# Patient Record
Sex: FEMALE | Race: WHITE | NOT HISPANIC OR LATINO | Employment: UNEMPLOYED | URBAN - METROPOLITAN AREA
[De-identification: names, ages, dates, MRNs, and addresses within clinical notes are randomized per-mention and may not be internally consistent; named-entity substitution may affect disease eponyms.]

---

## 2017-12-06 ENCOUNTER — RECORDS - HEALTHEAST (OUTPATIENT)
Dept: LAB | Facility: CLINIC | Age: 32
End: 2017-12-06

## 2017-12-06 LAB
CHOLEST SERPL-MCNC: 202 MG/DL
FASTING STATUS PATIENT QL REPORTED: NO
HDLC SERPL-MCNC: 66 MG/DL
LDLC SERPL CALC-MCNC: 112 MG/DL
TRIGL SERPL-MCNC: 118 MG/DL

## 2018-08-09 ENCOUNTER — OFFICE VISIT (OUTPATIENT)
Dept: FAMILY MEDICINE | Facility: CLINIC | Age: 33
End: 2018-08-09
Payer: COMMERCIAL

## 2018-08-09 VITALS
RESPIRATION RATE: 18 BRPM | TEMPERATURE: 98.2 F | BODY MASS INDEX: 22.5 KG/M2 | DIASTOLIC BLOOD PRESSURE: 78 MMHG | SYSTOLIC BLOOD PRESSURE: 125 MMHG | OXYGEN SATURATION: 100 % | WEIGHT: 140 LBS | HEART RATE: 72 BPM | HEIGHT: 66 IN

## 2018-08-09 DIAGNOSIS — E03.4 HYPOTHYROIDISM DUE TO ACQUIRED ATROPHY OF THYROID: ICD-10-CM

## 2018-08-09 DIAGNOSIS — Z01.419 ENCOUNTER FOR GYNECOLOGICAL EXAMINATION WITHOUT ABNORMAL FINDING: Primary | ICD-10-CM

## 2018-08-09 DIAGNOSIS — R60.0 LOCALIZED EDEMA: ICD-10-CM

## 2018-08-09 LAB — HGB BLD-MCNC: 12.6 G/DL (ref 11.7–15.7)

## 2018-08-09 PROCEDURE — G0476 HPV COMBO ASSAY CA SCREEN: HCPCS | Performed by: NURSE PRACTITIONER

## 2018-08-09 PROCEDURE — 36415 COLL VENOUS BLD VENIPUNCTURE: CPT | Performed by: NURSE PRACTITIONER

## 2018-08-09 PROCEDURE — 80048 BASIC METABOLIC PNL TOTAL CA: CPT | Performed by: NURSE PRACTITIONER

## 2018-08-09 PROCEDURE — G0145 SCR C/V CYTO,THINLAYER,RESCR: HCPCS | Performed by: NURSE PRACTITIONER

## 2018-08-09 PROCEDURE — 85018 HEMOGLOBIN: CPT | Performed by: NURSE PRACTITIONER

## 2018-08-09 PROCEDURE — 84443 ASSAY THYROID STIM HORMONE: CPT | Performed by: NURSE PRACTITIONER

## 2018-08-09 PROCEDURE — 99395 PREV VISIT EST AGE 18-39: CPT | Performed by: NURSE PRACTITIONER

## 2018-08-09 RX ORDER — LEVOTHYROXINE SODIUM 75 UG/1
75 TABLET ORAL DAILY
Qty: 90 TABLET | Refills: 3 | Status: SHIPPED | OUTPATIENT
Start: 2018-08-09

## 2018-08-09 RX ORDER — ETONOGESTREL/ETHINYL ESTRADIOL .12-.015MG
RING, VAGINAL VAGINAL
Refills: 4 | COMMUNITY
Start: 2018-06-12

## 2018-08-09 RX ORDER — LEVOTHYROXINE SODIUM 75 UG/1
TABLET ORAL
Refills: 6 | COMMUNITY
Start: 2018-06-22 | End: 2018-08-09

## 2018-08-09 NOTE — PATIENT INSTRUCTIONS

## 2018-08-09 NOTE — PROGRESS NOTES
SUBJECTIVE:   CC: Esther Gardner is an 32 year old woman who presents for preventive health visit.     Physical   Annual:     Getting at least 3 servings of Calcium per day:  Yes    Bi-annual eye exam:  NO    Dental care twice a year:  Yes    Sleep apnea or symptoms of sleep apnea:  None    Diet:  Carbohydrate counting    Frequency of exercise:  4-5 days/week    Duration of exercise:  45-60 minutes    Taking medications regularly:  Yes    Medication side effects:  None    ADDITIONAL:  Swelling, Ankles and calves, mostly left, water retention.  Drinking water.        Hypothyroid:  Diagnosed several years ago.  Levothyroxine 75mg daily seems to be going well.        Today's PHQ-2 Score:   PHQ-2 ( 1999 [employer]) 8/9/2018   Q1: Little interest or pleasure in doing things 0   Q2: Feeling down, depressed or hopeless 0   PHQ-2 Score 0   Q1: Little interest or pleasure in doing things Not at all   Q2: Feeling down, depressed or hopeless Not at all   PHQ-2 Score 0       Abuse: Current or Past(Physical, Sexual or Emotional)- No  Do you feel safe in your environment - Yes    Social History   Substance Use Topics     Smoking status: Former Smoker     Smokeless tobacco: Never Used     Alcohol use Yes     Alcohol Use 8/9/2018   If you drink alcohol do you typically have greater than 3 drinks per day OR greater than 7 drinks per week? No     Reviewed orders with patient.  Reviewed health maintenance and updated orders accordingly - Yes  Labs reviewed in EPIC  BP Readings from Last 3 Encounters:   08/09/18 125/78    Wt Readings from Last 3 Encounters:   08/09/18 140 lb (63.5 kg)                  There is no problem list on file for this patient.    History reviewed. No pertinent surgical history.    Social History   Substance Use Topics     Smoking status: Former Smoker     Smokeless tobacco: Never Used     Alcohol use Yes     Family History   Problem Relation Age of Onset     Hypertension Mother          Current Outpatient  "Prescriptions   Medication Sig Dispense Refill     levothyroxine (SYNTHROID/LEVOTHROID) 75 MCG tablet TAKE 1 TABLET BY MOUTH DAILY.  6     NUVARING 0.12-0.015 MG/24HR vaginal ring INSERT 1 RING VAGINALLY AS DIRECTED. REMOVE AFTER 3 WEEKS & WAIT 7 DAYS BEFORE INSERTING A NEW RING  4     Allergies   Allergen Reactions     Penicillins      No lab results found.     Mammogram not appropriate for this patient based on age.    Pertinent mammograms are reviewed under the imaging tab.  History of abnormal Pap smear: Last 3 Pap and HPV Results:       Reviewed and updated as needed this visit by clinical staff  Tobacco  Allergies  Meds  Med Hx  Surg Hx  Fam Hx  Soc Hx        Reviewed and updated as needed this visit by Provider            Review of Systems  CONSTITUTIONAL: NEGATIVE for fever, chills, change in weight  INTEGUMENTARU/SKIN: NEGATIVE for worrisome rashes, moles or lesions  EYES: NEGATIVE for vision changes or irritation  ENT: NEGATIVE for ear, mouth and throat problems  RESP: NEGATIVE for significant cough or SOB  BREAST: NEGATIVE for masses, tenderness or discharge  CV: NEGATIVE for chest pain, palpitations or peripheral edema  GI: NEGATIVE for nausea, abdominal pain, heartburn, or change in bowel habits  : NEGATIVE for unusual urinary or vaginal symptoms. Periods are regular.  MUSCULOSKELETAL: NEGATIVE for significant arthralgias or myalgia  NEURO: NEGATIVE for weakness, dizziness or paresthesias  ENDOCRINE: NEGATIVE for temperature intolerance, skin/hair changes  PSYCHIATRIC: NEGATIVE for changes in mood or affect     OBJECTIVE:   /78  Pulse 72  Temp 98.2  F (36.8  C) (Oral)  Resp 18  Ht 5' 5.5\" (1.664 m)  Wt 140 lb (63.5 kg)  SpO2 100%  BMI 22.94 kg/m2  Physical Exam  GENERAL: healthy, alert and no distress  EYES: Eyes grossly normal to inspection, PERRL and conjunctivae and sclerae normal  HENT: ear canals and TM's normal, nose and mouth without ulcers or lesions  NECK: no adenopathy, no " asymmetry, masses, or scars and thyroid normal to palpation  RESP: lungs clear to auscultation - no rales, rhonchi or wheezes  BREAST: normal without masses, tenderness or nipple discharge and no palpable axillary masses or adenopathy  CV: regular rate and rhythm, normal S1 S2, no S3 or S4, no murmur, click or rub, and peripheral pulses strong  ABDOMEN: soft, nontender, no hepatosplenomegaly, no masses and bowel sounds normal   (female): normal female external genitalia, normal urethral meatus, vaginal mucosa pink, moist, well rugated, and normal cervix/adnexa/uterus without masses or discharge  MS: no gross musculoskeletal defects noted  SKIN: no suspicious lesions or rashes. 1+ mid tibial edema bilaterally  NEURO: Normal strength and tone, mentation intact and speech normal  PSYCH: mentation appears normal, affect normal/bright    Diagnostic Test Results:  Resuls pending    ASSESSMENT/PLAN:   (Z01.419) Encounter for gynecological examination without abnormal finding  (primary encounter diagnosis)  Comment:   Plan: levothyroxine (SYNTHROID/LEVOTHROID) 75 MCG         tablet, Pap imaged thin layer screen with HPV -        recommended age 30 - 65 years (select HPV order        below), HPV High Risk Types DNA Cervical, TSH         with free T4 reflex            (E03.4) Hypothyroidism due to acquired atrophy of thyroid  Comment:   Plan: levothyroxine (SYNTHROID/LEVOTHROID) 75 MCG         tablet, TSH with free T4 reflex        Labs pending today.  Anticipate no changes in her levothyroxine dosing.  Yearly checkups/check ends for levothyroxine, sooner if pregnant.    (R60.0) Localized edema  Comment:   Plan: Basic metabolic panel, Hemoglobin  I discussed with the patient that I am not up for treating her mild edema today.  For now, I encouraged her to eat a diet low in salt, drink her fluids, and elevate her lower extremities when sitting.  In the event that her lab studies come back abnormal and/or if the edema worsens  "in any way, I would like her to return to the clinic for additional evaluation.  She agrees and understands.      COUNSELING:  Reviewed preventive health counseling, as reflected in patient instructions    BP Readings from Last 1 Encounters:   08/09/18 125/78     Estimated body mass index is 22.94 kg/(m^2) as calculated from the following:    Height as of this encounter: 5' 5.5\" (1.664 m).    Weight as of this encounter: 140 lb (63.5 kg).           reports that she has quit smoking. She has never used smokeless tobacco.      Counseling Resources:  ATP IV Guidelines  Pooled Cohorts Equation Calculator  Breast Cancer Risk Calculator  FRAX Risk Assessment  ICSI Preventive Guidelines  Dietary Guidelines for Americans, 2010  USDA's MyPlate  ASA Prophylaxis  Lung CA Screening    KIMMY Paz [facility]  Answers for HPI/ROS submitted by the patient on 8/9/2018   PHQ-2 Score: 0    "

## 2018-08-09 NOTE — MR AVS SNAPSHOT
After Visit Summary   8/9/2018    Esther Gardner    MRN: [id number]           Patient Information     Date Of Birth          1985        Visit Information        Provider Department      8/9/2018 4:00 PM Cherrie Meyers APRN [facility]        Today's Diagnoses     Encounter for gynecological examination without abnormal finding    -  1    Hypothyroidism due to acquired atrophy of thyroid        Localized edema          Care Instructions      Preventive Health Recommendations  Female Ages 26 - 39  Yearly exam:   See your health care provider every year in order to    Review health changes.     Discuss preventive care.      Review your medicines if you your doctor has prescribed any.    Until age 30: Get a Pap test every three years (more often if you have had an abnormal result).    After age 30: Talk to your doctor about whether you should have a Pap test every 3 years or have a Pap test with HPV screening every 5 years.   You do not need a Pap test if your uterus was removed (hysterectomy) and you have not had cancer.  You should be tested each year for STDs (sexually transmitted diseases), if you're at risk.   Talk to your provider about how often to have your cholesterol checked.  If you are at risk for diabetes, you should have a diabetes test (fasting glucose).  Shots: Get a flu shot each year. Get a tetanus shot every 10 years.   Nutrition:     Eat at least 5 servings of fruits and vegetables each day.    Eat whole-grain bread, whole-wheat pasta and brown rice instead of white grains and rice.    Get adequate Calcium and Vitamin D.     Lifestyle    Exercise at least 150 minutes a week (30 minutes a day, 5 days of the week). This will help you control your weight and prevent disease.    Limit alcohol to one drink per day.    No smoking.     Wear sunscreen to prevent skin cancer.    See your dentist every six months for an exam and cleaning.                 "Follow-ups after your visit        Who to contact     If you have questions or need follow up information about today's clinic visit or your schedule please contact [facility] directly at [phone].  Normal or non-critical lab and imaging results will be communicated to you by MyChart, letter or phone within 4 business days after the clinic has received the results. If you do not hear from us within 7 days, please contact the clinic through MyChart or phone. If you have a critical or abnormal lab result, we will notify you by phone as soon as possible.  Submit refill requests through [employer] or call your pharmacy and they will forward the refill request to us. Please allow 3 business days for your refill to be completed.          Additional Information About Your Visit        [employer] Information     [employer] lets you send messages to your doctor, view your test results, renew your prescriptions, schedule appointments and more. To sign up, go to [email or web address] . Click on \"Log in\" on the left side of the screen, which will take you to the Welcome page. Then click on \"Sign up Now\" on the right side of the page.     You will be asked to enter the access code listed below, as well as some personal information. Please follow the directions to create your username and password.     Your access code is: [email or web address]  Expires: 2018  4:24 PM     Your access code will  in 90 days. If you need help or a new code, please call your [facility] clinic or [phone].        Care EveryWhere ID     This is your Care EveryWhere ID. This could be used by other organizations to access your [facility] medical records  [id number]        Your Vitals Were     Pulse Temperature Respirations Height Last Period Pulse Oximetry    72 98.2  F (36.8  C) (Oral) 18 5' 5.5\" (1.664 m) 2018 (Approximate) 100%    BMI (Body Mass Index)                   22.94 kg/m2            Blood Pressure from Last 3 " Encounters:   08/09/18 125/78    Weight from Last 3 Encounters:   08/09/18 140 lb (63.5 kg)              We Performed the Following     Basic metabolic panel     Hemoglobin     HPV High Risk Types DNA Cervical     Pap imaged thin layer screen with HPV - recommended age 30 - 65 years (select HPV order below)     TSH with free T4 reflex          Today's Medication Changes          These changes are accurate as of 8/9/18  4:24 PM.  If you have any questions, ask your nurse or doctor.               These medicines have changed or have updated prescriptions.        Dose/Directions    levothyroxine 75 MCG tablet   Commonly known as:  SYNTHROID/LEVOTHROID   This may have changed:  See the new instructions.   Used for:  Encounter for gynecological examination without abnormal finding, Hypothyroidism due to acquired atrophy of thyroid   Changed by:  Cherrie Meyers APRN CNP        Dose:  75 mcg   Take 1 tablet (75 mcg) by mouth daily   Quantity:  90 tablet   Refills:  3            Where to get your medicines      These medications were sent to [facility] [facility] - [place], [place] - [address]  [address], [place] [address]     Phone:  [phone]     levothyroxine 75 MCG tablet                Primary Care Provider Fax #    Physician No Ref-Primary [phone]       No address on file        Equal Access to Services     RAQUEL RAMON : Yayo hawthorne Sogrecia, wadebda luashley, qaybta kaalmada adehair, jennifer isaac. So [facility] [phone].    ATENCIÓN: Si habla español, tiene a cevallos disposición servicios gratuitos de asistencia lingüística. Llame al [phone].    We comply with applicable federal civil rights laws and [place] laws. We do not discriminate on the basis of race, color, national origin, age, disability, sex, sexual orientation, or gender identity.            Thank you!     Thank you for choosing [facility]  for your care. Our goal is  always to provide you with excellent care. Hearing back from our patients is one way we can continue to improve our services. Please take a few minutes to complete the written survey that you may receive in the mail after your visit with us. Thank you!             Your Updated Medication List - Protect others around you: Learn how to safely use, store and throw away your medicines at www.disposemymeds.org.          This list is accurate as of 8/9/18  4:24 PM.  Always use your most recent med list.                   Brand Name Dispense Instructions for use Diagnosis    levothyroxine 75 MCG tablet    SYNTHROID/LEVOTHROID    90 tablet    Take 1 tablet (75 mcg) by mouth daily    Encounter for gynecological examination without abnormal finding, Hypothyroidism due to acquired atrophy of thyroid       NUVARING 0.12-0.015 MG/24HR vaginal ring   Generic drug:  etonogestrel-ethinyl estradiol      INSERT 1 RING VAGINALLY AS DIRECTED. REMOVE AFTER 3 WEEKS & WAIT 7 DAYS BEFORE INSERTING A NEW RING

## 2018-08-10 LAB
ANION GAP SERPL CALCULATED.3IONS-SCNC: 9 MMOL/L (ref 3–14)
BUN SERPL-MCNC: 14 MG/DL (ref 7–30)
CALCIUM SERPL-MCNC: 9 MG/DL (ref 8.5–10.1)
CHLORIDE SERPL-SCNC: 108 MMOL/L (ref 94–109)
CO2 SERPL-SCNC: 24 MMOL/L (ref 20–32)
CREAT SERPL-MCNC: 0.78 MG/DL (ref 0.52–1.04)
GFR SERPL CREATININE-BSD FRML MDRD: 85 ML/MIN/1.7M2
GLUCOSE SERPL-MCNC: 81 MG/DL (ref 70–99)
POTASSIUM SERPL-SCNC: 4.1 MMOL/L (ref 3.4–5.3)
SODIUM SERPL-SCNC: 141 MMOL/L (ref 133–144)
TSH SERPL DL<=0.005 MIU/L-ACNC: 2.37 MU/L (ref 0.4–4)

## 2018-08-10 NOTE — PROGRESS NOTES
Medardo Santana,    This is to let you know that the results of your recent blood tests are all normal.     Let me know if you have any questions.    Cherrie ONEAL CNP

## 2018-08-14 ENCOUNTER — MYC MEDICAL ADVICE (OUTPATIENT)
Dept: FAMILY MEDICINE | Facility: CLINIC | Age: 33
End: 2018-08-14

## 2018-08-14 LAB
COPATH REPORT: NORMAL
PAP: NORMAL

## 2018-08-14 NOTE — TELEPHONE ENCOUNTER
Tracey does not do this. I will print the release and forward to [facility] or outside facility.  Brittni ALVARENGA  [place]

## 2018-08-14 NOTE — TELEPHONE ENCOUNTER
ISRRAEL Meyers a release has been signed so results can be faxed to [facility] at [phone].    Send to care team with what you would like released to them.  Danitza Lunsford RN

## 2018-08-16 LAB
FINAL DIAGNOSIS: NORMAL
HPV HR 12 DNA CVX QL NAA+PROBE: NEGATIVE
HPV16 DNA SPEC QL NAA+PROBE: NEGATIVE
HPV18 DNA SPEC QL NAA+PROBE: NEGATIVE
SPECIMEN DESCRIPTION: NORMAL
SPECIMEN SOURCE CVX/VAG CYTO: NORMAL

## 2019-01-14 ENCOUNTER — MYC MEDICAL ADVICE (OUTPATIENT)
Dept: FAMILY MEDICINE | Facility: CLINIC | Age: 34
End: 2019-01-14

## 2019-01-14 NOTE — TELEPHONE ENCOUNTER
Cherrie Meyers, APRN CNP    Please review my chart message and advise     Thank you,   Stefany Irving, Registered Nurse   [facility]

## 2019-01-15 NOTE — TELEPHONE ENCOUNTER
Writer notes patient response    Closing encounter - no further actions needed at this time    Aria Gonzalez RN

## 2019-06-17 ENCOUNTER — NURSE TRIAGE (OUTPATIENT)
Dept: NURSING | Facility: CLINIC | Age: 34
End: 2019-06-17

## 2019-06-17 NOTE — TELEPHONE ENCOUNTER
"\"I had a positive pregnancy test yesterday and I started to bleed today\".    Warm transfer to scheduling to make an appointment.    Reason for Disposition    [1] Intermittent lower abdominal pain (e.g., cramping) AND [2] present > 24 hours    Additional Information    Negative: Shock suspected (e.g., cold/pale/clammy skin, too weak to stand, low BP, rapid pulse)    Negative: Difficult to awaken or acting confused (e.g., disoriented, slurred speech)    Negative: Passed out (i.e., lost consciousness, collapsed and was not responding)    Negative: Sounds like a life-threatening emergency to the triager    Negative: [1] Vaginal bleeding AND [2] pregnant > 20 weeks    Negative: Not pregnant or pregnancy status unknown    Negative: SEVERE abdominal pain    Negative: [1] SEVERE vaginal bleeding (i.e., soaking 2 pads / hour, large blood clots) AND [2] present 2 or more hours    Negative: SEVERE dizziness (e.g., unable to stand, requires support to walk, feels like passing out)    Negative: [1] MODERATE vaginal bleeding (i.e., soaking 1 pad / hour; clots) AND [2] present > 6 hours    Negative: [1] MODERATE vaginal bleeding (i.e., soaking 1 pad / hour; clots) AND [2] pregnant > 12 weeks    Negative: Passed tissue (e.g., gray-white)    Negative: Shoulder pain    Negative: Pale skin (pallor) of new onset or worsening    Negative: Patient sounds very sick or weak to the triager    Negative: [1] Constant abdominal pain AND [2] present > 2 hours    Negative: Fever > 100.4 F (38.0 C)    Protocols used: PREGNANCY - VAGINAL BLEEDING LESS THAN 20 WEEKS KYMBERLY Rowe RN  [facility]      "

## 2019-06-18 NOTE — TELEPHONE ENCOUNTER
Patient received a phone call from [facility] this morning (6/18/19) and was instructed to set up an appointment with OB instead of Family practice.  Per scheduling, an OB appointment was not available until next week.  Writer discussed patients symptoms and recommended that she follow-up in an Urgent Care or ED setting today as guideline recommended she be seen by a provider within 24 hours.  Patient with plans to go to a [facility] Urgent Care due to insurance restrictions.    Rhonda Luong RN  [facility]

## 2020-03-11 ENCOUNTER — HEALTH MAINTENANCE LETTER (OUTPATIENT)
Age: 35
End: 2020-03-11

## 2024-01-01 ENCOUNTER — APPOINTMENT (OUTPATIENT)
Dept: ULTRASOUND IMAGING | Facility: CLINIC | Age: 39
End: 2024-01-01
Attending: EMERGENCY MEDICINE
Payer: COMMERCIAL

## 2024-01-01 ENCOUNTER — HOSPITAL ENCOUNTER (EMERGENCY)
Facility: CLINIC | Age: 39
Discharge: HOME | End: 2024-01-01
Attending: EMERGENCY MEDICINE | Admitting: EMERGENCY MEDICINE
Payer: COMMERCIAL

## 2024-01-01 VITALS
BODY MASS INDEX: 23.32 KG/M2 | TEMPERATURE: 100.1 F | HEIGHT: 65 IN | DIASTOLIC BLOOD PRESSURE: 83 MMHG | RESPIRATION RATE: 16 BRPM | SYSTOLIC BLOOD PRESSURE: 150 MMHG | OXYGEN SATURATION: 100 % | HEART RATE: 78 BPM | WEIGHT: 140 LBS

## 2024-01-01 DIAGNOSIS — Z32.01 PREGNANCY TEST POSITIVE: ICD-10-CM

## 2024-01-01 LAB
HCG INTACT+B SERPL-ACNC: 126 MIU/ML
HCG UR QL: NEGATIVE

## 2024-01-01 PROCEDURE — 36415 COLL VENOUS BLD VENIPUNCTURE: CPT | Performed by: EMERGENCY MEDICINE

## 2024-01-01 PROCEDURE — 81025 URINE PREGNANCY TEST: CPT | Performed by: EMERGENCY MEDICINE

## 2024-01-01 PROCEDURE — 99284 EMERGENCY DEPT VISIT MOD MDM: CPT | Mod: 25

## 2024-01-01 PROCEDURE — 76801 OB US < 14 WKS SINGLE FETUS: CPT

## 2024-01-01 PROCEDURE — 84702 CHORIONIC GONADOTROPIN TEST: CPT | Performed by: EMERGENCY MEDICINE

## 2024-01-01 ASSESSMENT — ACTIVITIES OF DAILY LIVING (ADL)
ADLS_ACUITY_SCORE: 35
ADLS_ACUITY_SCORE: 35

## 2024-01-01 NOTE — DISCHARGE INSTRUCTIONS
Your hCG level today was 128 this is higher than it has been.  We have performed an ultrasound that shows a small cyst on the left ovary but no clear pregnancy.  Please follow-up with your OB/GYN for serial hCG levels and serial ultrasounds until an established pregnancy is identified.  If it continues to linger at low levels discussed with your primary OB/GYN's Jaime gynecologist the possibility of retraining products of conception after early miscarriage.  Return to the emergency room with severe abdominal pain or heavy vaginal bleeding.  Thanks for your patience,

## 2024-01-01 NOTE — ED PROVIDER NOTES
"  History     Chief Complaint:  Pregnancy Concern     The history is provided by the patient.      Esther Gardner is a 38 year old female with a history of hypothyroidism who presents to the emergency department for pregnancy concern. The patient states that she is trying for her third child but has received several HCG results that suggest a nonviable pregnancy. She notes that she also took 2 at home pregnancy tests, one resulted positive and the other resulted negative, last night. She adds that her last normal menstrual period was  but adds that on 12/15 she did experience heavy vaginal bleeding. Denies any abdominal pain or current vaginal bleeding. Denies any hx of an ectopic pregnancy.    Independent Historian:   None - Patient Only    Review of External Notes:       Medications:    Levothyroxine    Past Medical History:    Hypothyroidism    Past Surgical History:    [place] tooth extraction       Physical Exam   Patient Vitals for the past 24 hrs:   BP Temp Temp src Pulse Resp SpO2 Height Weight   24 1019 (!) 150/83 100.1  F (37.8  C) Oral 78 16 100 % 1.651 m (5' 5\") 63.5 kg (140 lb)      Physical Exam  Vitals reviewed.   Eyes:      Pupils: Pupils are equal, round, and reactive to light.   Cardiovascular:      Rate and Rhythm: Normal rate.   Pulmonary:      Effort: Pulmonary effort is normal.   Abdominal:      General: Abdomen is flat. Bowel sounds are normal. There is no distension.   Neurological:      Mental Status: She is alert.         Emergency Department Course       Imaging:  OB  US 1st trimester w transvag   Final Result   IMPRESSION:    1.  No intrauterine gestational sac is identified. Findings are compatible with pregnancy of unknown location. Recommend correlation with serial beta-hCG and/ultrasound as clinically indicated.             Laboratory:  Labs Ordered and Resulted from Time of ED Arrival to Time of ED Departure   HCG QUANTITATIVE PREGNANCY - Abnormal       Result " Value    hCG Quantitative 126 (*)    HCG QUALITATIVE URINE - Normal    hCG Urine Qualitative Negative              Emergency Department Course & Assessments:    Interventions:  Medications - No data to display     Assessments:  1046 I obtained history and examined the patient as noted above.     Independent Interpretation (X-rays, CTs, rhythm strip):  None    Consultations/Discussion of Management or Tests:  None     Social Determinants of Health affecting care:   None    Disposition:  The patient was discharged to home.     Impression & Plan      Medical Decision Making:  Patient presents with ongoing concern for pregnancy.  Patient had serial hCGs that have been trivially positive and some vaginal bleeding at 1 point negative and then elevated.  Patient had a urine pregnancy test ordered at triage and was negative but due to history of very low hCG levels a serum hCG level was also ordered and positive at 126 this is higher than she is ever been therefore a ultrasound was recommended for assessment for ectopic this was negative for visualized pregnancy.  Patient has since had some bleeding differential diagnosis includes early pregnancy encouraged threatened or incomplete miscarriage or early ectopic pregnancy.  Patient encouraged to follow-up as an outpatient no severe pain or vaginal bleeding noted today and was discharged home in stable condition.    Diagnosis:    ICD-10-CM    1. Pregnancy test positive  Z32.01            Discharge Medications:  Discharge Medication List as of 1/1/2024  1:27 PM        Scribe Disclosure:  I, Luigi Calvo, am serving as a scribe at 10:53 AM on 1/1/2024 to document services personally performed by Momo Trejo MD based on my observations and the provider's statements to me.     1/1/2024   Momo Trejo MD Goodman, Brian Samuel, MD  01/01/24 [id number]

## 2024-01-01 NOTE — ED TRIAGE NOTES
Pt presents with concern for ectopic pregnancy. Pt denies abdominal pain or discharge.     Positive test on 12/14, 12/15 heavy bleeding and cramping, 12/18 positive test again, HCG test 12/19 27. 2 days HCG test came back at 34. On 12/26, pt was scheduled to have an ultrasound and another HCG test, but on 12/22 took another pregnancy test which was negative, so she cancelled the appointment.     Pt states that she should have ovulated this past weekend, but denies symptoms of ovulation. Pt took another pregnancy test last night (12/31) which was positive.